# Patient Record
Sex: FEMALE | Employment: FULL TIME | URBAN - METROPOLITAN AREA
[De-identification: names, ages, dates, MRNs, and addresses within clinical notes are randomized per-mention and may not be internally consistent; named-entity substitution may affect disease eponyms.]

---

## 2019-04-25 ENCOUNTER — TRANSFERRED RECORDS (OUTPATIENT)
Dept: HEALTH INFORMATION MANAGEMENT | Facility: CLINIC | Age: 34
End: 2019-04-25

## 2019-05-02 ENCOUNTER — TRANSFERRED RECORDS (OUTPATIENT)
Dept: HEALTH INFORMATION MANAGEMENT | Facility: CLINIC | Age: 34
End: 2019-05-02

## 2019-05-02 ENCOUNTER — MEDICAL CORRESPONDENCE (OUTPATIENT)
Dept: HEALTH INFORMATION MANAGEMENT | Facility: CLINIC | Age: 34
End: 2019-05-02

## 2019-05-08 ENCOUNTER — TELEPHONE (OUTPATIENT)
Dept: OBGYN | Facility: CLINIC | Age: 34
End: 2019-05-08

## 2019-05-08 NOTE — TELEPHONE ENCOUNTER
Pt states she is about 6w3d pregnant.  She is having some cold symptoms as well as morning sickness.  She had questions about OTC medications that are safe to take during pregnancy for her symptoms.  Discussed Tylenol is safe for aches and pains, Afrin nasal spray or saline nasal spray for nasal congestion, can try Claritin, and throat lozenges without Zinc for sore throat.  I also suggested to drink lots of water and get rest.  For morning sickness, I suggested eat several smaller meals throughout the day, avoid spicy/high fat foods, eat crackers before getting out of bed in the morning, eat a high protein snack before bed at night, take prenatal in evening or stop prenatal and take just a folic acid until morning sickness resolves, B6 and unisom.  Pt verbalized understanding and agreed to plan.  Pt was scheduled for a new prenatal with Dr. Bryant tomorrow to discuss these things.  She is willing to give the above tips a try and reschedule her new prenatal with Dr. Croft when she is around 10-12 weeks gestation.  Rescheduled her appt.  Advised to call sooner if questions or concerns.  Pt verbalized understanding and agreed to plan.    Mary Pyle RN

## 2019-05-20 ENCOUNTER — TELEPHONE (OUTPATIENT)
Dept: OBGYN | Facility: CLINIC | Age: 34
End: 2019-05-20

## 2019-05-20 NOTE — TELEPHONE ENCOUNTER
Took call from patient. I verified that she was not light headed or dizzy. She will try to call back around 2:45 when her next break will be. I also talked with her about mychart, I did send txt message to her to set up and she will try to message as well    Michelle Valdes  [facility]

## 2019-05-20 NOTE — TELEPHONE ENCOUNTER
[facility] Call Center    Phone Message    May a detailed message be left on voicemail: yes    Reason for Call: Other: Pt requesting a call back to discuss some pregnancy related symptoms. Pt states she is experiencing nausea/morning sickness and extreme fatigue. Pt states this is her first pregnancy and would like to know if this is normal or something to be worried about. Please advise.      Action Taken: Message routed to:  [facility] p [phone]

## 2019-05-21 NOTE — TELEPHONE ENCOUNTER
Reviewed patient chart. She is not a current patient of our and will need to est care prior to any advice given    Michelle Valdes  [facility]

## 2019-05-22 NOTE — TELEPHONE ENCOUNTER
Patient calls today with complaints of nausea and vomiting after taking prenatal vitamin. RN recommends that patient take the prenatal at night with food, not on a empty stomach. She could also look at switching to a different prenatal. Assessed bowel movements, she states she isn't as regular, so RN gave recommendations to drink plenty of fluids, be active, and eat fruits/veggies and fiber. Patient verbalized understanding and agreed to plan.   No further questions at this time.     Rowan Mendoza RN on 5/22/2019 at 11:05 AM

## 2019-05-29 ENCOUNTER — TELEPHONE (OUTPATIENT)
Dept: OBGYN | Facility: CLINIC | Age: 34
End: 2019-05-29

## 2019-05-29 NOTE — TELEPHONE ENCOUNTER
"Pt states she had a bout of dizziness for about 15 seconds this morning.  She feels fine now but was wondering if she should be concerned.  Pt has been experiencing \"morning sickness\" and hasn't been drinking or eating as much.  Pt states she only drinks about 5 cups of water daily.  Advised pt to drink small sips of water constantly throughout the day to stay hydrated.  Pt states her urine is darker yellow but she is urinating about 3 times a day.  Advised pt to drink more water, eat small frequent nutritious meals daily.  Advised to change positions slowly.  If symptoms worsen: dizziness does not improve after resting, she goes longer than 10-12 hours between urinating, urine becomes darker then she should call back for possible dehydration.  Pt does have a new prenatal visit with Dr. Croft on 6/6.  Advised her to discuss nausea with Dr. Croft at that time.  Pt verbalized understanding and agreed to plan.    Mary Pyle RN    "

## 2019-06-04 ENCOUNTER — TELEPHONE (OUTPATIENT)
Dept: OBGYN | Facility: CLINIC | Age: 34
End: 2019-06-04

## 2019-06-04 ENCOUNTER — MYC MEDICAL ADVICE (OUTPATIENT)
Dept: OBGYN | Facility: CLINIC | Age: 34
End: 2019-06-04

## 2019-06-04 NOTE — TELEPHONE ENCOUNTER
[facility] Call Center    Phone Message    May a detailed message be left on voicemail: yes    Reason for Call: Other: Pt is requesting a call back to discuss upcoming appt and what to expect. Please advise.     Action Taken: Message routed to:  [facility] p [phone]

## 2019-06-04 NOTE — TELEPHONE ENCOUNTER
Writer returning call to pt. Unable to reach patient via phone. Left message to call clinic back.     Emily Abebe RN on 6/4/2019 at 4:39 PM

## 2019-06-05 NOTE — TELEPHONE ENCOUNTER
Closing encounter at this time.  Pt sent in a [employer] message regarding the same questions.  Refer to the mychart message sent yesterday, 6/4/19, for further documentation.    Mary Pyle RN

## 2019-06-06 ENCOUNTER — PRENATAL OFFICE VISIT (OUTPATIENT)
Dept: OBGYN | Facility: CLINIC | Age: 34
End: 2019-06-06
Payer: COMMERCIAL

## 2019-06-06 ENCOUNTER — ANCILLARY PROCEDURE (OUTPATIENT)
Dept: ULTRASOUND IMAGING | Facility: CLINIC | Age: 34
End: 2019-06-06
Attending: OBSTETRICS & GYNECOLOGY
Payer: COMMERCIAL

## 2019-06-06 VITALS
DIASTOLIC BLOOD PRESSURE: 73 MMHG | OXYGEN SATURATION: 99 % | HEIGHT: 65 IN | BODY MASS INDEX: 24.61 KG/M2 | HEART RATE: 81 BPM | WEIGHT: 147.7 LBS | SYSTOLIC BLOOD PRESSURE: 102 MMHG

## 2019-06-06 DIAGNOSIS — O09.521 HIGH-RISK PREGNANCY, ELDERLY MULTIGRAVIDA IN FIRST TRIMESTER: Primary | ICD-10-CM

## 2019-06-06 DIAGNOSIS — O09.521 ELDERLY MULTIGRAVIDA IN FIRST TRIMESTER: ICD-10-CM

## 2019-06-06 DIAGNOSIS — Z78.9 DATE OF LAST MENSTRUAL PERIOD (LMP) UNKNOWN: ICD-10-CM

## 2019-06-06 LAB
ABO + RH BLD: NORMAL
ABO + RH BLD: NORMAL
BASOPHILS # BLD AUTO: 0 10E9/L (ref 0–0.2)
BASOPHILS NFR BLD AUTO: 0.5 %
BLD GP AB SCN SERPL QL: NORMAL
BLOOD BANK CMNT PATIENT-IMP: NORMAL
DIFFERENTIAL METHOD BLD: NORMAL
EOSINOPHIL # BLD AUTO: 0.1 10E9/L (ref 0–0.7)
EOSINOPHIL NFR BLD AUTO: 1 %
ERYTHROCYTE [DISTWIDTH] IN BLOOD BY AUTOMATED COUNT: 12.8 % (ref 10–15)
HCT VFR BLD AUTO: 37.4 % (ref 35–47)
HGB BLD-MCNC: 12.5 G/DL (ref 11.7–15.7)
IMM GRANULOCYTES # BLD: 0 10E9/L (ref 0–0.4)
IMM GRANULOCYTES NFR BLD: 0.2 %
LYMPHOCYTES # BLD AUTO: 1.9 10E9/L (ref 0.8–5.3)
LYMPHOCYTES NFR BLD AUTO: 33.7 %
MCH RBC QN AUTO: 30.5 PG (ref 26.5–33)
MCHC RBC AUTO-ENTMCNC: 33.4 G/DL (ref 31.5–36.5)
MCV RBC AUTO: 91 FL (ref 78–100)
MONOCYTES # BLD AUTO: 0.4 10E9/L (ref 0–1.3)
MONOCYTES NFR BLD AUTO: 7.5 %
NEUTROPHILS # BLD AUTO: 3.3 10E9/L (ref 1.6–8.3)
NEUTROPHILS NFR BLD AUTO: 57.1 %
PLATELET # BLD AUTO: 222 10E9/L (ref 150–450)
RBC # BLD AUTO: 4.1 10E12/L (ref 3.8–5.2)
SPECIMEN EXP DATE BLD: NORMAL
WBC # BLD AUTO: 5.7 10E9/L (ref 4–11)

## 2019-06-06 PROCEDURE — 87340 HEPATITIS B SURFACE AG IA: CPT | Performed by: OBSTETRICS & GYNECOLOGY

## 2019-06-06 PROCEDURE — G0145 SCR C/V CYTO,THINLAYER,RESCR: HCPCS | Performed by: OBSTETRICS & GYNECOLOGY

## 2019-06-06 PROCEDURE — 86850 RBC ANTIBODY SCREEN: CPT | Performed by: OBSTETRICS & GYNECOLOGY

## 2019-06-06 PROCEDURE — 86901 BLOOD TYPING SEROLOGIC RH(D): CPT | Performed by: OBSTETRICS & GYNECOLOGY

## 2019-06-06 PROCEDURE — G0476 HPV COMBO ASSAY CA SCREEN: HCPCS | Performed by: OBSTETRICS & GYNECOLOGY

## 2019-06-06 PROCEDURE — 36415 COLL VENOUS BLD VENIPUNCTURE: CPT | Performed by: OBSTETRICS & GYNECOLOGY

## 2019-06-06 PROCEDURE — 87591 N.GONORRHOEAE DNA AMP PROB: CPT | Performed by: OBSTETRICS & GYNECOLOGY

## 2019-06-06 PROCEDURE — 86780 TREPONEMA PALLIDUM: CPT | Performed by: OBSTETRICS & GYNECOLOGY

## 2019-06-06 PROCEDURE — 99203 OFFICE O/P NEW LOW 30 MIN: CPT | Performed by: OBSTETRICS & GYNECOLOGY

## 2019-06-06 PROCEDURE — 76801 OB US < 14 WKS SINGLE FETUS: CPT | Performed by: STUDENT IN AN ORGANIZED HEALTH CARE EDUCATION/TRAINING PROGRAM

## 2019-06-06 PROCEDURE — 86762 RUBELLA ANTIBODY: CPT | Performed by: OBSTETRICS & GYNECOLOGY

## 2019-06-06 PROCEDURE — 87389 HIV-1 AG W/HIV-1&-2 AB AG IA: CPT | Performed by: OBSTETRICS & GYNECOLOGY

## 2019-06-06 PROCEDURE — 85025 COMPLETE CBC W/AUTO DIFF WBC: CPT | Performed by: OBSTETRICS & GYNECOLOGY

## 2019-06-06 PROCEDURE — 86900 BLOOD TYPING SEROLOGIC ABO: CPT | Performed by: OBSTETRICS & GYNECOLOGY

## 2019-06-06 PROCEDURE — 87086 URINE CULTURE/COLONY COUNT: CPT | Performed by: OBSTETRICS & GYNECOLOGY

## 2019-06-06 PROCEDURE — 87491 CHLMYD TRACH DNA AMP PROBE: CPT | Performed by: OBSTETRICS & GYNECOLOGY

## 2019-06-06 RX ORDER — CEPHALEXIN 500 MG/1
500 CAPSULE ORAL
COMMUNITY
Start: 2019-06-05 | End: 2019-07-12

## 2019-06-06 ASSESSMENT — MIFFLIN-ST. JEOR: SCORE: 1369.8

## 2019-06-06 NOTE — PROGRESS NOTES
"Taniya is a 33 year old female, , who is here today for her first OB visit at 10 4/7 weeks gestation and is new to the [place] [facility] dept.  She has had a positive home pregnancy test.  Due to her AMA status, a referral for genetic counseling was offered and she would like this.  She denies any current ob issues and has not had a first trimester OB US to-date.  [facility]  Her social hx is negative and she is taking a PNV daily po.    ROS: Ten point review of systems was reviewed and negative except the above.    Gyn Hx:      Past Medical History:   Diagnosis Date     NO ACTIVE PROBLEMS      Past Surgical History:   Procedure Laterality Date     LASIK       Patient Active Problem List   Diagnosis     CARDIOVASCULAR SCREENING; LDL GOAL LESS THAN 160       ALL/Meds: Her medication and allergy histories were reviewed and are documented in their appropriate chart areas.    SH: Reviewed and documented in the appropriate area of the chart.    FH: Her family history was reviewed and documented in its appropriate chart area.    PE: /73   Pulse 81   Ht 1.657 m (5' 5.25\")   Wt 67 kg (147 lb 11.2 oz)   LMP 2019   SpO2 99%   Breastfeeding? No   BMI 24.39 kg/m    Body mass index is 24.39 kg/m .    Urine HCG was +  Hrt - RRR without murmur  Lungs - CTAB  Neck - supple without palpable mass  Breasts - no nipple discharge or palpable mass  Abd - soft, nontender, gravid, fhts 160 bpm per doppler  Pelvic - normal external female genitalia, normal vaginal mucosa, closed cervix without motion tenderness, no uterine tenderness with size consistent with dates, no adnexal mass or tenderness  Ext - negative    A/P:   - I discussed with her nutrition and medication concerns related to pregnancy.  We discussed folic acid supplementation.  We reviewed prenatal care.  She is given the opportunity to ask questions and have them answered.  Due to her AMA status, a referral to [facility] was made " since the patient is interested in genetic counseling.  Will confirm dates during the 1st trimester so an OB US was ordered.  Her social hx is negative and she is already taking a PNV daily po.  Check labs today.    30 minutes were spent face to face with the patient today discussing her history, diagnosis, and follow-up plan as noted above.  Over 50% of the visit was spent in counseling and coordination of care.    Total Visit Time: 30 minutes.    Orders Placed This Encounter   Procedures     US OB <14 Weeks w Transvaginal Single     Hepatitis B surface antigen     Rubella Antibody IgG Quantitative     CBC with platelets differential     HIV Antigen Antibody Combo     Treponema Abs w Reflex to RPR and Titer     Pap imaged thin layer screen with HPV - recommended age 30 - 65 years (select HPV order below)     HPV High Risk Types DNA Cervical     PERINATOLOGY REFERRAL     ABO/Rh type and screen     Fidelina Croft DO  FACOG, FACS

## 2019-06-07 LAB
BACTERIA SPEC CULT: NO GROWTH
C TRACH DNA SPEC QL NAA+PROBE: NEGATIVE
HBV SURFACE AG SERPL QL IA: NONREACTIVE
HIV 1+2 AB+HIV1 P24 AG SERPL QL IA: NONREACTIVE
N GONORRHOEA DNA SPEC QL NAA+PROBE: NEGATIVE
RUBV IGG SERPL IA-ACNC: 17 IU/ML
SPECIMEN SOURCE: NORMAL
T PALLIDUM AB SER QL: NONREACTIVE

## 2019-06-10 LAB
COPATH REPORT: NORMAL
PAP: NORMAL

## 2019-06-11 LAB
FINAL DIAGNOSIS: NORMAL
HPV HR 12 DNA CVX QL NAA+PROBE: NEGATIVE
HPV16 DNA SPEC QL NAA+PROBE: NEGATIVE
HPV18 DNA SPEC QL NAA+PROBE: NEGATIVE
SPECIMEN DESCRIPTION: NORMAL
SPECIMEN SOURCE CVX/VAG CYTO: NORMAL

## 2019-06-17 ENCOUNTER — TRANSFERRED RECORDS (OUTPATIENT)
Dept: HEALTH INFORMATION MANAGEMENT | Facility: CLINIC | Age: 34
End: 2019-06-17

## 2019-06-24 ENCOUNTER — TELEPHONE (OUTPATIENT)
Dept: OBGYN | Facility: CLINIC | Age: 34
End: 2019-06-24

## 2019-06-24 NOTE — TELEPHONE ENCOUNTER
Benito, genetic counselor at [facility], called requesting an order for patients level 2 comprehensive ultra sound that is tentatively scheduled for August. RN told her that Dr. Croft was not in clinic today but would route to her.    Benito verbalized understanding and agreed to plan.     Rowan Mendoza RN on 6/24/2019 at 2:53 PM

## 2019-07-11 ENCOUNTER — TELEPHONE (OUTPATIENT)
Dept: OBGYN | Facility: OTHER | Age: 34
End: 2019-07-11

## 2019-07-11 ENCOUNTER — TELEPHONE (OUTPATIENT)
Dept: OBGYN | Facility: CLINIC | Age: 34
End: 2019-07-11

## 2019-07-11 NOTE — TELEPHONE ENCOUNTER
Pt calling to ask if she can go to urgent care. Writer informed pt that urgent care would send her to the ER. Pt verbalized understanding and will go to the ER.     Emily Abebe RN on 7/11/2019 at 2:08 PM

## 2019-07-11 NOTE — TELEPHONE ENCOUNTER
Pt has only been having these for a few days and one at the start of her pregnancy. Pt is having fainting or dizzy spells. Three yesterday, gets sweaty, nauseous, blurry vision, loose peripheral vision, lightheaded, feels like she is going to pass out. pt has to lay down.   Pt had two of these fainting spells, getting more severe. Each time. Pt states she is well hydrated, pt is eating frequently, pt denies working out excessively, or caffeine intake, pt states she is sleeping.   Pt has a headache that comes and goes the headaches happened at the same time as the fainting spells pt says.     Pt has never lost consciousness.   Pt is going to have her  come home and  her to the ER.   Pt will follow up with Dr. Murrieta tomorrow at her OB appointment at 0845.    Emily Abebe RN on 7/11/2019 at 1:33 PM

## 2019-07-12 ENCOUNTER — PRENATAL OFFICE VISIT (OUTPATIENT)
Dept: OBGYN | Facility: CLINIC | Age: 34
End: 2019-07-12
Payer: COMMERCIAL

## 2019-07-12 VITALS
WEIGHT: 157.8 LBS | SYSTOLIC BLOOD PRESSURE: 105 MMHG | HEART RATE: 75 BPM | BODY MASS INDEX: 26.06 KG/M2 | DIASTOLIC BLOOD PRESSURE: 70 MMHG

## 2019-07-12 DIAGNOSIS — O09.512 AMA (ADVANCED MATERNAL AGE) PRIMIGRAVIDA 35+, SECOND TRIMESTER: Primary | ICD-10-CM

## 2019-07-12 PROBLEM — O09.519 AMA (ADVANCED MATERNAL AGE) PRIMIGRAVIDA 35+: Status: ACTIVE | Noted: 2019-07-12

## 2019-07-12 PROCEDURE — 99212 OFFICE O/P EST SF 10 MIN: CPT | Performed by: OBSTETRICS & GYNECOLOGY

## 2019-07-12 PROCEDURE — 99000 SPECIMEN HANDLING OFFICE-LAB: CPT | Performed by: OBSTETRICS & GYNECOLOGY

## 2019-07-12 PROCEDURE — 82105 ALPHA-FETOPROTEIN SERUM: CPT | Mod: 90 | Performed by: OBSTETRICS & GYNECOLOGY

## 2019-07-12 PROCEDURE — 36415 COLL VENOUS BLD VENIPUNCTURE: CPT | Performed by: OBSTETRICS & GYNECOLOGY

## 2019-07-12 NOTE — PROGRESS NOTES
Presents for routine  appointment.    Was seen in the ED yesterday for dizziness.  She was given IV fluids and zofran.  She is feeling better today.  The dizzy episodes happen more often while at work.  She has nausea, no vomiting. She is using unisom.  She is also worried about the degree of fatigue.  hgb yesterday was 12.4  No LOF/VB/Ctxs.    ROS:   and GI  negative.     Please see Prenatal Vitals and Notes Flowsheet for objective data.    A/P:  33 year old  at 16w3d       ICD-10-CM    1. AMA (advanced maternal age) primigravida 35+, second trimester O09.512 Alpha fetoprotein maternal screen       Increase hydration.  She will let me know if she would like the cardiology referral or ECHO.    Genetic screening - NIPT done with [facility].  She was told everything was normal.    Patient would  like ONTD screening.   Plan level 2 US - already scheduled with [facility] Aug 2.    Discussed supportive care for nausea  Follow up in 4 week(s).      Audra Murrieta MD

## 2019-07-12 NOTE — NURSING NOTE
"Chief Complaint   Patient presents with     Prenatal Care       Initial /70 (BP Location: Right arm, Patient Position: Chair, Cuff Size: Adult Regular)   Pulse 75   Wt 71.6 kg (157 lb 12.8 oz)   LMP 2019   BMI 26.06 kg/m   Estimated body mass index is 24.39 kg/m  as calculated from the following:    Height as of 19: 1.657 m (5' 5.25\").    Weight as of 19: 67 kg (147 lb 11.2 oz).  BP completed using cuff size: regular            Alessandra Joseph, TASHA  2019    "

## 2019-07-15 LAB
# FETUSES US: NORMAL
# FETUSES: 1
AFP ADJ MOM AMN: 0.59
AFP SERPL-MCNC: 20 NG/ML
AGE - REPORTED: 36.3 YR
CURRENT SMOKER: NO
CURRENT SMOKER: NO
DIABETES STATUS PATIENT: NO
FAMILY MEMBER DISEASES HX: NO
FAMILY MEMBER DISEASES HX: NO
GA METHOD: NORMAL
GA METHOD: NORMAL
GA: NORMAL WK
IDDM PATIENT QL: NO
INTEGRATED SCN PATIENT-IMP: NORMAL
LMP START DATE: NORMAL
MONOCHORIONIC TWINS: NO
SERVICE CMNT-IMP: NO
SPECIMEN DRAWN SERPL: NORMAL
VALPROIC/CARBAMAZEPINE STATUS: NO
WEIGHT UNITS: NORMAL

## 2023-12-05 NOTE — PATIENT INSTRUCTIONS
If you have any questions regarding your visit, Please contact your care team.    [facility]: [phone]      [facility] HOURS TELEPHONE NUMBER   Fidelina DO Guerda.    TASHA Zhu -    EKNNA Hernandez       Monday, Wednesday, Thursday and Friday, [place]  8:30a.m-5:00 p.m   [facility]  [address] [place] [address]  [phone] ask for [facility]    Imaging [phone]       Urgent Care locations:    [facility] Saturday and Sunday   9 am - 5 pm    Monday-Friday   12 pm - 8 pm  Saturday and Sunday   9 am - 5 pm   [phone]     [facility] Labor and Delivery:  [phone]    If you need a medication refill, please contact your pharmacy. Please allow 3 business days for your refill to be completed.  As always, Thank you for trusting us with your healthcare needs!        
Attending Attestation (For Attendings USE Only)...